# Patient Record
Sex: MALE | Race: WHITE
[De-identification: names, ages, dates, MRNs, and addresses within clinical notes are randomized per-mention and may not be internally consistent; named-entity substitution may affect disease eponyms.]

---

## 2018-06-20 ENCOUNTER — HOSPITAL ENCOUNTER (OUTPATIENT)
Dept: HOSPITAL 88 - ER | Age: 33
Setting detail: OBSERVATION
Discharge: HOME | End: 2018-06-20
Attending: INTERNAL MEDICINE | Admitting: INTERNAL MEDICINE
Payer: COMMERCIAL

## 2018-06-20 VITALS — DIASTOLIC BLOOD PRESSURE: 90 MMHG | SYSTOLIC BLOOD PRESSURE: 156 MMHG

## 2018-06-20 VITALS — SYSTOLIC BLOOD PRESSURE: 139 MMHG | DIASTOLIC BLOOD PRESSURE: 81 MMHG

## 2018-06-20 VITALS — SYSTOLIC BLOOD PRESSURE: 124 MMHG | DIASTOLIC BLOOD PRESSURE: 68 MMHG

## 2018-06-20 VITALS — SYSTOLIC BLOOD PRESSURE: 130 MMHG | DIASTOLIC BLOOD PRESSURE: 76 MMHG

## 2018-06-20 VITALS — HEIGHT: 73 IN | WEIGHT: 247 LBS | BODY MASS INDEX: 32.74 KG/M2

## 2018-06-20 VITALS — SYSTOLIC BLOOD PRESSURE: 162 MMHG | DIASTOLIC BLOOD PRESSURE: 82 MMHG

## 2018-06-20 VITALS — DIASTOLIC BLOOD PRESSURE: 81 MMHG | SYSTOLIC BLOOD PRESSURE: 139 MMHG

## 2018-06-20 DIAGNOSIS — I10: ICD-10-CM

## 2018-06-20 DIAGNOSIS — R10.13: ICD-10-CM

## 2018-06-20 DIAGNOSIS — F17.210: ICD-10-CM

## 2018-06-20 DIAGNOSIS — N30.00: ICD-10-CM

## 2018-06-20 DIAGNOSIS — R00.2: ICD-10-CM

## 2018-06-20 DIAGNOSIS — F41.9: ICD-10-CM

## 2018-06-20 DIAGNOSIS — R07.9: Primary | ICD-10-CM

## 2018-06-20 DIAGNOSIS — F90.9: ICD-10-CM

## 2018-06-20 DIAGNOSIS — R68.84: ICD-10-CM

## 2018-06-20 LAB
ALBUMIN SERPL-MCNC: 4.1 G/DL (ref 3.5–5)
ALBUMIN/GLOB SERPL: 1.2 {RATIO} (ref 0.8–2)
ALP SERPL-CCNC: 72 IU/L (ref 40–150)
ALT SERPL-CCNC: 41 IU/L (ref 0–55)
AMPHETAMINES UR QL SCN>1000 NG/ML: POSITIVE
ANION GAP SERPL CALC-SCNC: 14.6 MMOL/L (ref 8–16)
BACTERIA URNS QL MICRO: (no result) /HPF
BASOPHILS # BLD AUTO: 0.1 10*3/UL (ref 0–0.1)
BASOPHILS NFR BLD AUTO: 0.7 % (ref 0–1)
BENZODIAZ UR QL SCN: NEGATIVE
BILIRUB UR QL: NEGATIVE
BUN SERPL-MCNC: 10 MG/DL (ref 7–26)
BUN/CREAT SERPL: 10 (ref 6–25)
CALCIUM SERPL-MCNC: 9.9 MG/DL (ref 8.4–10.2)
CHLORIDE SERPL-SCNC: 101 MMOL/L (ref 98–107)
CHOLEST SERPL-MCNC: 194 MD/DL (ref 0–199)
CHOLEST/HDLC SERPL: 4.1 {RATIO} (ref 3.9–4.7)
CK MB SERPL-MCNC: 3.7 NG/ML (ref 0–5)
CK MB SERPL-MCNC: 4.2 NG/ML (ref 0–5)
CK SERPL-CCNC: 258 IU/L (ref 30–200)
CK SERPL-CCNC: 326 IU/L (ref 30–200)
CLARITY UR: (no result)
CO2 SERPL-SCNC: 26 MMOL/L (ref 22–29)
COLOR UR: YELLOW
DEPRECATED APTT PLAS QN: 33.4 SECONDS (ref 23.8–35.5)
DEPRECATED INR PLAS: 1.04
DEPRECATED NEUTROPHILS # BLD AUTO: 5.7 10*3/UL (ref 2.1–6.9)
DEPRECATED RBC URNS MANUAL-ACNC: (no result) /HPF (ref 0–5)
EGFRCR SERPLBLD CKD-EPI 2021: > 60 ML/MIN (ref 60–?)
EOSINOPHIL # BLD AUTO: 0.5 10*3/UL (ref 0–0.4)
EOSINOPHIL NFR BLD AUTO: 5.1 % (ref 0–6)
EPI CELLS URNS QL MICRO: (no result) /LPF
ERYTHROCYTE [DISTWIDTH] IN CORD BLOOD: 12.4 % (ref 11.7–14.4)
GLOBULIN PLAS-MCNC: 3.5 G/DL (ref 2.3–3.5)
GLUCOSE SERPLBLD-MCNC: 103 MG/DL (ref 74–118)
HCT VFR BLD AUTO: 46.6 % (ref 38.2–49.6)
HDLC SERPL-MSCNC: 47 MG/DL (ref 40–60)
HGB BLD-MCNC: 16.4 G/DL (ref 14–18)
KETONES UR QL STRIP.AUTO: NEGATIVE
LDLC SERPL CALC-MCNC: 111 MG/DL (ref 60–130)
LEUKOCYTE ESTERASE UR QL STRIP.AUTO: (no result)
LYMPHOCYTES # BLD: 2.6 10*3/UL (ref 1–3.2)
LYMPHOCYTES NFR BLD AUTO: 26.4 % (ref 18–39.1)
MAGNESIUM SERPL-MCNC: 1.8 MG/DL (ref 1.3–2.1)
MCH RBC QN AUTO: 32.2 PG (ref 28–32)
MCHC RBC AUTO-ENTMCNC: 35.2 G/DL (ref 31–35)
MCV RBC AUTO: 91.4 FL (ref 81–99)
MONOCYTES # BLD AUTO: 0.8 10*3/UL (ref 0.2–0.8)
MONOCYTES NFR BLD AUTO: 8.5 % (ref 4.4–11.3)
NEUTS SEG NFR BLD AUTO: 59 % (ref 38.7–80)
NITRITE UR QL STRIP.AUTO: NEGATIVE
PCP UR QL SCN: NEGATIVE
PLATELET # BLD AUTO: 346 X10E3/UL (ref 140–360)
POTASSIUM SERPL-SCNC: 3.6 MMOL/L (ref 3.5–5.1)
PROT UR QL STRIP.AUTO: NEGATIVE
PROTHROMBIN TIME: 12.8 SECONDS (ref 11.9–14.5)
RBC # BLD AUTO: 5.1 X10E6/UL (ref 4.3–5.7)
SODIUM SERPL-SCNC: 138 MMOL/L (ref 136–145)
SP GR UR STRIP: 1.02 (ref 1.01–1.02)
TRIGL SERPL-MCNC: 181 MG/DL (ref 0–149)
TSH SERPL DL<=0.005 MIU/L-ACNC: 0.96 UIU/ML (ref 0.35–4.94)
UROBILINOGEN UR STRIP-MCNC: 0.2 MG/DL (ref 0.2–1)
WBC #/AREA URNS HPF: >50 /HPF (ref 0–5)

## 2018-06-20 PROCEDURE — 74177 CT ABD & PELVIS W/CONTRAST: CPT

## 2018-06-20 PROCEDURE — 93005 ELECTROCARDIOGRAM TRACING: CPT

## 2018-06-20 PROCEDURE — 81001 URINALYSIS AUTO W/SCOPE: CPT

## 2018-06-20 PROCEDURE — 85730 THROMBOPLASTIN TIME PARTIAL: CPT

## 2018-06-20 PROCEDURE — 99284 EMERGENCY DEPT VISIT MOD MDM: CPT

## 2018-06-20 PROCEDURE — 71260 CT THORAX DX C+: CPT

## 2018-06-20 PROCEDURE — 82553 CREATINE MB FRACTION: CPT

## 2018-06-20 PROCEDURE — 83735 ASSAY OF MAGNESIUM: CPT

## 2018-06-20 PROCEDURE — 80061 LIPID PANEL: CPT

## 2018-06-20 PROCEDURE — 83880 ASSAY OF NATRIURETIC PEPTIDE: CPT

## 2018-06-20 PROCEDURE — 82550 ASSAY OF CK (CPK): CPT

## 2018-06-20 PROCEDURE — 80053 COMPREHEN METABOLIC PANEL: CPT

## 2018-06-20 PROCEDURE — 71046 X-RAY EXAM CHEST 2 VIEWS: CPT

## 2018-06-20 PROCEDURE — 93306 TTE W/DOPPLER COMPLETE: CPT

## 2018-06-20 PROCEDURE — 84443 ASSAY THYROID STIM HORMONE: CPT

## 2018-06-20 PROCEDURE — 85610 PROTHROMBIN TIME: CPT

## 2018-06-20 PROCEDURE — 93017 CV STRESS TEST TRACING ONLY: CPT

## 2018-06-20 PROCEDURE — 84484 ASSAY OF TROPONIN QUANT: CPT

## 2018-06-20 PROCEDURE — 85379 FIBRIN DEGRADATION QUANT: CPT

## 2018-06-20 PROCEDURE — 36415 COLL VENOUS BLD VENIPUNCTURE: CPT

## 2018-06-20 PROCEDURE — 85025 COMPLETE CBC W/AUTO DIFF WBC: CPT

## 2018-06-20 PROCEDURE — 80307 DRUG TEST PRSMV CHEM ANLYZR: CPT

## 2018-06-20 NOTE — DIAGNOSTIC IMAGING REPORT
PROCEDURE: CT ABDOMEN AND PELVIS WITH CONTRAST

 

TECHNIQUE: 

The abdomen and pelvis were scanned utilizing a multidetector helical 

scanner from the diaphragm to the lesser trochanter after the IV 

administration of 100 cc of Isovue 370 and the oral administration of 

900 cc of water.  Coronal and sagittal multiplanar reformations were 

obtained.

DLP: 1078.55 mGy-cm

 

COMPARISON: None.

 

INDICATIONS:   CHEST, ABDOMEN PAIN

 

FINDINGS:

THORAX: Please see same day chest CT for chest findings. 

 

HEPATOBILIARY: No focal hepatic lesions.  No biliary ductal dilatation.

SPLEEN: No splenomegaly.

PANCREAS: No focal masses or ductal dilatation.

 

ADRENALS: No adrenal nodules.

KIDNEYS/URETERS: No hydronephrosis, stones, or solid mass lesions.

PELVIC ORGANS/BLADDER: Unremarkable.

 

PERITONEUM / RETROPERITONEUM: No free air or fluid.

LYMPH NODES: No lymphadenopathy.

VESSELS: Unremarkable.

 

GI TRACT: No distention or wall thickening.

 

BONES AND SOFT TISSUES: Unremarkable.

 

IMPRESSION:

No acute abnormalities in the abdomen and pelvis. 

 

 

Dictated by:  Anand Mcdermott M.D. on 6/20/2018 at 13:48     

Electronically approved by:  Anand Mcdermott M.D. on 6/20/2018 at 13:48

## 2018-06-20 NOTE — DIAGNOSTIC IMAGING REPORT
PROCEDURE:

CT scan of the chest  WITH intravenous contrast, using standard 

protocol.

 

TECHNIQUE:

The chest was scanned utilizing a multidetector helical scanner from 

the lung apex through the level of the adrenal glands after the IV 

administration of 100 cc of Isovue 370.  Coronal and sagittal 

multiplanar reformations were obtained.

DLP: 1078.55 mGy-cm

 

COMPARISON: Same day chest radiograph. 

 

INDICATIONS:  CHEST, ABDOMEN PAIN

    

FINDINGS:

Lines/tubes:  None.

 

Lungs and Airways:  The lungs and airways are normal. There is mild 

dependent atelectasis. No consolidations are demonstrated. 

 

Pleura: The pleural spaces are clear.

 

Heart and mediastinum: The thyroid gland is normal. No significant 

mediastinal, hilar or axillary lymphadenopathy is seen. The heart and 

pericardium are within normal limits. Main pulmonary artery measures 

2.5 cm in diameter, within normal limits. Ascending aorta measures 2.5 

cm in diameter, within normal limits.

 

Soft tissues: Normal. 

 

Abdomen: Please see same day CT abdomen and pelvis for abdominal 

findings. 

 

Bones: The visualized bony thorax is within normal limits. 

 

IMPRESSION: 

Normal chest CT.

 

 

 

Dictated by:  Anand Mcdermott M.D. on 6/20/2018 at 13:41     

Electronically approved by:  Anand Mcdermott M.D. on 6/20/2018 at 13:41

## 2018-06-20 NOTE — HISTORY AND PHYSICAL
PCP:  Prem Rocha MD 



CONSULTANT:  Nuvia Gold MD 



CHIEF COMPLAINT:  Epigastric pain and chest pain associated with radiation 

of pain to the left upper extremity and to the jaw area. 



HISTORY OF PRESENT ILLNESS:  The patient is a pleasant, 33-year-old male 

with ADD.  The patient is on Adderall.  Basically he was doing fine, but 

approximately 2 months ago he had the same similar episode where he had 

some upper abdominal pain and subsequent left-sided chest pain, like 

squeezing pain radiating to his left jaw and to the left upper extremity.  

While he was trying to sleep, he developed a jerking movement.  The pain is 

intensified.  Blood pressure went up.  The patient in the emergency room, 

his blood pressure went up to the 163 to 111.  Previously, when he has 

taken blood pressure medication, he felt better.  Patient's blood pressure 

now is 124/68.  His pulse rate is 101.  Otherwise, the patient is stable.  

He is asymptomatic now.  He did have one episode earlier where he had 

severe chest pain where he received ketorolac, and it improved his 

symptoms.



PAST MEDICAL HISTORY:  ADD and elevation of blood pressure at times.



HOME MEDICATIONS:  Adderall 20 mg t.i.d.



ALLERGIES:  NO KNOWN ALLERGIES.



PAST SURGICAL HISTORY:  Noncontributory. 



SOCIAL HISTORY:  Patient works as a  in a chemical plant.  He 

has protective gear and no recent chemical exposure.  He does smoke.  He is 

a social drinker. 



PHYSICAL EXAMINATION

VITAL SIGNS:  Temperature is 98.  Blood pressure 124/68.  Pulse rate 86.  

Respirations 20.  GENERAL EXAM:  The patient is not in acute distress.  He 

is awake.  

HEENT:  Normocephalic.  Sclerae are anicteric. 

NECK:  Supple grossly. 

PULMONARY:  Clear. 

CARDIOVASCULAR:  Regular rate and rhythm.  

ABDOMEN:  Soft.  Generalized discomfort, but no guarding or rebound 

tenderness.  

EXTREMITIES:  No gross cyanosis or edema. 

NEUROLOGIC:  There is no gross focal deficit. 



LABORATORY:  Sodium is 138, potassium 3.6, chloride 101, bicarb 26, BUN 10, 

creatinine 0.9.  Glucose is 103.  Creatinine kinase is 326.  AST is 35.  

ALT is 41.  Cardiac enzymes are negative.



Toxicology positive for amphetamine for which the patient has prescribed 

medication.  Positive for opiate.



Urine has 1+ leukocyte esterase, greater than 50 WBCs.  WBC is 9.6, 

hemoglobin 16.4, hematocrit 46.6 and platelets is 346.



Chest x-ray is unremarkable.



IMPRESSION

1. Abdominal and chest pain, etiology unclear, recurrent.  This is the 

2nd time.

2. Urinary tract infection.

3. Pressure chest pain.

4. Episodic increase in blood pressure.



PLAN

1. CT of the chest, abdomen and pelvis with contrast.  

2. Echocardiogram.  

3. Consultation with Dr. Nuvia Gold.  

4. Continue with supportive medication.  

5. Pain control.  

6. Will monitor the patient closely.  

7. The patient will resume his home medications.





  _________________________________

LUIS FERNANDO LORENZO MD



DD:  06/20/2018 09:37

DT:  06/20/2018 10:05

Job#:  [id number]

## 2018-06-20 NOTE — CARDIOLOGY REPORT
DATE OF STUDY:  June 20, 2018 



CARDIAC STRESS TEST  



TECHNICAL DETAILS:  The protocol is Terrence with target heart rate at 161 per 

minute (85%).



RESULTS

1. Patient exercised for a total of 9 minutes 1 second.

2. Heart rate increased from 70 per minute to 165 per minute.

3. Blood pressure increased from 110/70 to 160/80.

4. No chest pain.

5. No EKG changes.



IMPRESSION:  Negative cardiac stress test.  Limitations of a negative 

cardiac stress test are explained and discussed.  









DD:  06/20/2018 12:42

DT:  06/20/2018 13:12

Job#:  [id number]

## 2018-06-20 NOTE — DIAGNOSTIC IMAGING REPORT
CHEST 2 VIEWS,    



Technique: CHEST 2 VIEWS

Comparison: 7/11/2015

Clinical history:      Chest pain 



DISCUSSION:

Normal appearance of the heart, mediastinum, lungs and pleural spaces.



IMPRESSION:

No acute abnormality



Signed by: Dr Maryann Rodas MD on 6/20/2018 3:30 AM

## 2018-06-20 NOTE — CONSULTATION
DATE OF CONSULTATION:  June 20, 2018 



CARDIOLOGY CONSULTATION 



REASON FOR CONSULTATION:  Chest pain.



HPI:  This is a 33-year-old male with history of ADHD on Adderall therapy 

and history of "occasional hypertension."  The patient presented to 

[facility] ER with complaints of initial abdominal pain and 

some left-sided chest tightness.  Therefore, cardiology was consulted.  The 

patient was seen in the room in no acute distress.  He reports he has had 

several episodes of left-sided chest pain and tightness going down to his 

left shoulder and arm.  However, last night, while he was lying in bed, he 

had symptoms again.  Therefore, he came to the ER for evaluation.  He 

reports during these chest pain episodes some shortness of breath, 

palpitations and some slight nausea.  He states the discomforts lasted for 

hours, and they relieved on their own.  Labs drawn are showing negative 

troponin times 2 and negative BNP.  EKG showing no ST changes suggestive of 

acute event.  Of note, he had a UA which was suggestive of urinary tract 

infection, and he has been given antibiotic therapy.



PAST MEDICAL HISTORY:  ADHD, also "occasional hypertension."



HOME MEDICATIONS:  Adderall 20 mg t.i.d. 



SURGICAL HISTORY:  Left meniscus repair.  



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 

services.  He is a smoker for greater than 10 years of 1/2 pack per day.  

He reports alcohol use on occasion.



FAMILY HISTORY:  Mother is alive, age 50, with a history of hypertension.  

Father at age 60 apparently has a history of alcoholism.  Maternal 

grandmother has history of bypass.  Paternal grandfather has a history of 

CAD and CABG.  



ALLERGIES:  NO KNOWN ALLERGIES.  



REVIEW OF SYSTEMS

GENERAL:  Denies any fever, chills, weight gain, night sweats.

SKIN:  No rashes or sores.

HEENT:  Some nausea and vomiting times 1.  Denies any visual changes.  No 

blurred vision or double vision, any earaches or discharge, any epistaxis, 

rhinorrhea, stuffiness, any sore throat or hoarseness.

CARDIAC:  Positive chest pain as above.  Occasional palpitations.  Positive 

dyspnea on exertion.  Denies any orthopnea, PND, or lower extremity edema.

RESPIRATORY:  Positive for shortness of breath on exertion.  Denies any 

wheezing or hemoptysis.

GI:  Good appetite.  Occasional nausea and vomiting.  Denies any 

hematemesis.

URINARY:  Denies any polyuria, dysuria, hematuria.

VASCULAR:  Denies any lower extremity edema or claudication.

MUSCULOSKELETAL:  Generalized joint pains and bilateral knee pains.

NEURO:  Right toe numbness.  No blackouts, fainting, seizures.

HEMATOLOGY:  No anemia.  No easy bruising. 

ENDOCRINE:  No heat or cold intolerance.  No polyuria, polydipsia, or 

polyphagia.



PHYSICAL EXAMINATION 

VITAL SIGNS:  Current temperature 97, pulse 81, respiratory rate 17, blood 

pressure 130/76, pulse ox 98%.  

GENERAL:  In no acute distress, reliable informant.  Well developed.  

SKIN:  No rashes or bruises.  Positive tattoos through left shoulder and 

left arm.

HEENT:  Normocephalic.  Pupils are equal and reactive.  Extraocular motor 

intact.  Trachea is midline.  No thyromegaly.  No JVD.  No carotid bruits 

noted.

HEART:  Regular rate and rhythm.  No murmurs.  No clicks.

LUNGS:  Bilateral breath sounds are clear to auscultation.  

ABDOMEN:  Soft, nontender and nondistended.  No organomegaly noted.

MUSCULOSKELETAL:  Good muscle strength throughout.  No lower extremity 

swelling.

VASCULAR:  There are +2 bilateral radial pulses and +2 DP and PT pulses 

bilaterally.

NEUROLOGIC:  Cranial nerves II through XII seem intact.



LABS:  Sodium 138, potassium 3.6, BUN 10, creatinine 0.9.  Troponin less 

than 0.001 times 2.  BNP less than 10.  HDL 47, .  Hemoglobin 16, 

hematocrit 46, platelets 346.  D-dimer less than 100.  UA is showing 

leukocyte esterase +1 and urine WBCs greater than 50. 



Chest x-ray:  No acute abnormalities.



EKG:  Sinus tachycardia at 100.  No ST changes suggestive of an acute 

event.  



ASSESSMENT

1. Urinary tract infection.

2. Chest pain.

3. Hypertension.

4. Palpitations.

5. Anxiety.



PLAN

1. The patient presents with mixed features.  Thus far, negative enzymes 

times 2.  EKG without any acute changes suggestive of an acute event.  

The patient is very concerned with these symptoms.  He reports this is 

the 3rd time he has had these chest pain symptoms and wants further 

evaluation.  Echocardiogram has been ordered and is currently being 

done.  Will be reviewed by cardiology attending.  

2. Will go ahead and do a stress test as per the patient's request to 

evaluate the symptoms.   

3. Will continue tele monitoring.  The patient is reporting palpitations 

intermittently.  However, tele was reviewed, and no arrhythmia was 

noted.  

4. Antibiotic therapy as per primary service for his UTI.

5. The blood pressure is reasonable right now.  We will just continue to 

monitor for now.

6. We will go ahead and check a TSH level.



Thank you very much for this consult.  We will adjust cardiac therapy as 

course dictates.



Dictated by:  Rolando Renee NP



 





DD:  06/20/2018 12:08

DT:  06/20/2018 13:01

Job#:  [id number]

## 2019-08-27 ENCOUNTER — HOSPITAL ENCOUNTER (INPATIENT)
Dept: HOSPITAL 88 - ER | Age: 34
LOS: 1 days | Discharge: HOME | DRG: 897 | End: 2019-08-28
Attending: INTERNAL MEDICINE | Admitting: INTERNAL MEDICINE
Payer: SELF-PAY

## 2019-08-27 VITALS — DIASTOLIC BLOOD PRESSURE: 59 MMHG | SYSTOLIC BLOOD PRESSURE: 85 MMHG

## 2019-08-27 VITALS — SYSTOLIC BLOOD PRESSURE: 113 MMHG | DIASTOLIC BLOOD PRESSURE: 65 MMHG

## 2019-08-27 VITALS — DIASTOLIC BLOOD PRESSURE: 71 MMHG | SYSTOLIC BLOOD PRESSURE: 124 MMHG

## 2019-08-27 VITALS — DIASTOLIC BLOOD PRESSURE: 86 MMHG | SYSTOLIC BLOOD PRESSURE: 127 MMHG

## 2019-08-27 VITALS — DIASTOLIC BLOOD PRESSURE: 81 MMHG | SYSTOLIC BLOOD PRESSURE: 140 MMHG

## 2019-08-27 VITALS — SYSTOLIC BLOOD PRESSURE: 104 MMHG | DIASTOLIC BLOOD PRESSURE: 79 MMHG

## 2019-08-27 VITALS — SYSTOLIC BLOOD PRESSURE: 126 MMHG | DIASTOLIC BLOOD PRESSURE: 79 MMHG

## 2019-08-27 VITALS — SYSTOLIC BLOOD PRESSURE: 128 MMHG | DIASTOLIC BLOOD PRESSURE: 75 MMHG

## 2019-08-27 VITALS — SYSTOLIC BLOOD PRESSURE: 102 MMHG | DIASTOLIC BLOOD PRESSURE: 63 MMHG

## 2019-08-27 VITALS — DIASTOLIC BLOOD PRESSURE: 83 MMHG | SYSTOLIC BLOOD PRESSURE: 128 MMHG

## 2019-08-27 VITALS — DIASTOLIC BLOOD PRESSURE: 76 MMHG | SYSTOLIC BLOOD PRESSURE: 129 MMHG

## 2019-08-27 VITALS — DIASTOLIC BLOOD PRESSURE: 94 MMHG | SYSTOLIC BLOOD PRESSURE: 143 MMHG

## 2019-08-27 VITALS — DIASTOLIC BLOOD PRESSURE: 108 MMHG | SYSTOLIC BLOOD PRESSURE: 198 MMHG

## 2019-08-27 VITALS — SYSTOLIC BLOOD PRESSURE: 88 MMHG | DIASTOLIC BLOOD PRESSURE: 70 MMHG

## 2019-08-27 VITALS — WEIGHT: 210 LBS | HEIGHT: 73 IN | BODY MASS INDEX: 27.83 KG/M2

## 2019-08-27 VITALS — DIASTOLIC BLOOD PRESSURE: 87 MMHG | SYSTOLIC BLOOD PRESSURE: 128 MMHG

## 2019-08-27 VITALS — SYSTOLIC BLOOD PRESSURE: 88 MMHG | DIASTOLIC BLOOD PRESSURE: 69 MMHG

## 2019-08-27 VITALS — SYSTOLIC BLOOD PRESSURE: 137 MMHG | DIASTOLIC BLOOD PRESSURE: 66 MMHG

## 2019-08-27 DIAGNOSIS — G45.9: ICD-10-CM

## 2019-08-27 DIAGNOSIS — F15.10: ICD-10-CM

## 2019-08-27 DIAGNOSIS — G89.29: ICD-10-CM

## 2019-08-27 DIAGNOSIS — M62.82: ICD-10-CM

## 2019-08-27 DIAGNOSIS — R16.1: ICD-10-CM

## 2019-08-27 DIAGNOSIS — Z96.653: ICD-10-CM

## 2019-08-27 DIAGNOSIS — F90.9: ICD-10-CM

## 2019-08-27 DIAGNOSIS — F11.23: Primary | ICD-10-CM

## 2019-08-27 LAB
ALBUMIN SERPL-MCNC: 4.2 G/DL (ref 3.5–5)
ALBUMIN/GLOB SERPL: 1.3 {RATIO} (ref 0.8–2)
ALP SERPL-CCNC: 76 IU/L (ref 40–150)
ALT SERPL-CCNC: 36 IU/L (ref 0–55)
AMPHETAMINES UR QL SCN>1000 NG/ML: POSITIVE
ANION GAP SERPL CALC-SCNC: 13.9 MMOL/L (ref 8–16)
BACTERIA URNS QL MICRO: (no result) /HPF
BASE EXCESS BLDA CALC-SCNC: 0 MMOL/L (ref -2–3)
BASOPHILS # BLD AUTO: 0.1 10*3/UL (ref 0–0.1)
BASOPHILS NFR BLD AUTO: 0.7 % (ref 0–1)
BENZODIAZ UR QL SCN: POSITIVE
BILIRUB UR QL: NEGATIVE
BUN SERPL-MCNC: 10 MG/DL (ref 7–26)
BUN/CREAT SERPL: 10 (ref 6–25)
CALCIUM SERPL-MCNC: 10.2 MG/DL (ref 8.4–10.2)
CHLORIDE SERPL-SCNC: 103 MMOL/L (ref 98–107)
CK MB SERPL-MCNC: 11.5 NG/ML (ref 0–5)
CK MB SERPL-MCNC: 7.4 NG/ML (ref 0–5)
CK MB SERPL-MCNC: 8.5 NG/ML (ref 0–5)
CK SERPL-CCNC: 359 IU/L (ref 30–200)
CK SERPL-CCNC: 436 IU/L (ref 30–200)
CK SERPL-CCNC: 512 IU/L (ref 30–200)
CLARITY UR: CLEAR
CO2 SERPL-SCNC: 25 MMOL/L (ref 22–29)
COLOR UR: YELLOW
DEPRECATED NEUTROPHILS # BLD AUTO: 8 10*3/UL (ref 2.1–6.9)
DEPRECATED RBC URNS MANUAL-ACNC: (no result) /HPF (ref 0–5)
EGFRCR SERPLBLD CKD-EPI 2021: > 60 ML/MIN (ref 60–?)
EOSINOPHIL # BLD AUTO: 0.3 10*3/UL (ref 0–0.4)
EOSINOPHIL NFR BLD AUTO: 2.4 % (ref 0–6)
EPI CELLS URNS QL MICRO: (no result) /LPF
ERYTHROCYTE [DISTWIDTH] IN CORD BLOOD: 12.5 % (ref 11.7–14.4)
GLOBULIN PLAS-MCNC: 3.2 G/DL (ref 2.3–3.5)
GLUCOSE SERPLBLD-MCNC: 111 MG/DL (ref 74–118)
HCO3 BLDA-SCNC: 26 MMOL/L (ref 23–28)
HCT VFR BLD AUTO: 46.6 % (ref 38.2–49.6)
HGB BLD-MCNC: 16.1 G/DL (ref 14–18)
KETONES UR QL STRIP.AUTO: NEGATIVE
LEUKOCYTE ESTERASE UR QL STRIP.AUTO: (no result)
LYMPHOCYTES # BLD: 1.6 10*3/UL (ref 1–3.2)
LYMPHOCYTES NFR BLD AUTO: 15 % (ref 18–39.1)
MCH RBC QN AUTO: 31.5 PG (ref 28–32)
MCHC RBC AUTO-ENTMCNC: 34.5 G/DL (ref 31–35)
MCV RBC AUTO: 91.2 FL (ref 81–99)
MONOCYTES # BLD AUTO: 0.8 10*3/UL (ref 0.2–0.8)
MONOCYTES NFR BLD AUTO: 7 % (ref 4.4–11.3)
NEUTS SEG NFR BLD AUTO: 74.4 % (ref 38.7–80)
NITRITE UR QL STRIP.AUTO: NEGATIVE
PCO2 BLDA: 172 MMHG (ref 80–105)
PCO2 BLDA: 51 MMHG (ref 41–51)
PCP UR QL SCN: NEGATIVE
PH BLDA: 7.32 [PH] (ref 7.31–7.41)
PLATELET # BLD AUTO: 374 X10E3/UL (ref 140–360)
POTASSIUM SERPL-SCNC: 3.9 MMOL/L (ref 3.5–5.1)
PROT UR QL STRIP.AUTO: NEGATIVE
RBC # BLD AUTO: 5.11 X10E6/UL (ref 4.3–5.7)
SAO2 % BLDA: 99 % (ref 95–98)
SODIUM SERPL-SCNC: 138 MMOL/L (ref 136–145)
SP GR UR STRIP: <=1.005 (ref 1.01–1.02)
UROBILINOGEN UR STRIP-MCNC: 0.2 MG/DL (ref 0.2–1)
WBC #/AREA URNS HPF: (no result) /HPF (ref 0–5)

## 2019-08-27 PROCEDURE — 82553 CREATINE MB FRACTION: CPT

## 2019-08-27 PROCEDURE — 81001 URINALYSIS AUTO W/SCOPE: CPT

## 2019-08-27 PROCEDURE — 80076 HEPATIC FUNCTION PANEL: CPT

## 2019-08-27 PROCEDURE — 80053 COMPREHEN METABOLIC PANEL: CPT

## 2019-08-27 PROCEDURE — 36600 WITHDRAWAL OF ARTERIAL BLOOD: CPT

## 2019-08-27 PROCEDURE — 80307 DRUG TEST PRSMV CHEM ANLYZR: CPT

## 2019-08-27 PROCEDURE — 84484 ASSAY OF TROPONIN QUANT: CPT

## 2019-08-27 PROCEDURE — 70450 CT HEAD/BRAIN W/O DYE: CPT

## 2019-08-27 PROCEDURE — 87340 HEPATITIS B SURFACE AG IA: CPT

## 2019-08-27 PROCEDURE — 85025 COMPLETE CBC W/AUTO DIFF WBC: CPT

## 2019-08-27 PROCEDURE — 99285 EMERGENCY DEPT VISIT HI MDM: CPT

## 2019-08-27 PROCEDURE — 94002 VENT MGMT INPAT INIT DAY: CPT

## 2019-08-27 PROCEDURE — 83605 ASSAY OF LACTIC ACID: CPT

## 2019-08-27 PROCEDURE — 36415 COLL VENOUS BLD VENIPUNCTURE: CPT

## 2019-08-27 PROCEDURE — 31500 INSERT EMERGENCY AIRWAY: CPT

## 2019-08-27 PROCEDURE — 82805 BLOOD GASES W/O2 SATURATION: CPT

## 2019-08-27 PROCEDURE — 83735 ASSAY OF MAGNESIUM: CPT

## 2019-08-27 PROCEDURE — 82550 ASSAY OF CK (CPK): CPT

## 2019-08-27 PROCEDURE — 76700 US EXAM ABDOM COMPLETE: CPT

## 2019-08-27 PROCEDURE — 71045 X-RAY EXAM CHEST 1 VIEW: CPT

## 2019-08-27 PROCEDURE — 86803 HEPATITIS C AB TEST: CPT

## 2019-08-27 PROCEDURE — 93005 ELECTROCARDIOGRAM TRACING: CPT

## 2019-08-27 RX ADMIN — PROPOFOL PRN MLS/HR: 10 INJECTION, EMULSION INTRAVENOUS at 07:50

## 2019-08-27 RX ADMIN — SODIUM CHLORIDE SCH MLS/HR: 9 INJECTION, SOLUTION INTRAVENOUS at 16:29

## 2019-08-27 RX ADMIN — PROPOFOL PRN MLS/HR: 10 INJECTION, EMULSION INTRAVENOUS at 14:30

## 2019-08-27 RX ADMIN — SODIUM CHLORIDE PRN MLS/HR: 900 INJECTION, SOLUTION INTRAVENOUS at 08:55

## 2019-08-27 RX ADMIN — SODIUM CHLORIDE SCH MLS/HR: 9 INJECTION, SOLUTION INTRAVENOUS at 09:27

## 2019-08-27 RX ADMIN — PROPOFOL PRN MLS/HR: 10 INJECTION, EMULSION INTRAVENOUS at 11:21

## 2019-08-27 RX ADMIN — SODIUM CHLORIDE PRN MLS/HR: 900 INJECTION, SOLUTION INTRAVENOUS at 09:59

## 2019-08-27 RX ADMIN — PROPOFOL PRN MLS/HR: 10 INJECTION, EMULSION INTRAVENOUS at 09:26

## 2019-08-27 RX ADMIN — SODIUM CHLORIDE SCH MLS/HR: 9 INJECTION, SOLUTION INTRAVENOUS at 23:39

## 2019-08-27 NOTE — NUR
DR LEYVA AT BEDSIDE DISCUSSING THE CURRENT PLAN OF CARE WITH PATIENT AND 
FAMILY, PATIENT TO BE INTUBATED AND SEDATED FOR PATIENT SAFETY. PATIENT 
AGGITATED, WILL NOT SIT IN BED, CLIMBING OVER BED RAILS, AGGRESSIVE, 
UNCOOPERATIVE AFTER RECEIVING GEODON AND VERSED. MULTIPLE RN'S AT BEDSIDE WITH 
PATIENT. NOTIFIED RESPIRATORY. RESPIRATORY AT BEDSIDE FOR STAT INTUBATION.

## 2019-08-27 NOTE — NUR
PATIENT CONFUSED, DISORIENTED, AGGRESSIVE, COMBATIVE, UNCOOPERATIVE, CLIMBING 
OVER BED RAILS. PATIENT RE-ORITENTED, HELPED BACK INTO BED BY DOUG PANG, 
EDUCATED PATIENT AND FAMILY ON SAFETY MEASURES. RN CONTINUALLY AT BEDSIDE FOR 
PATIENT SAFETY.  DR LEYVA AT BEDSIDE FOR RE-EVAL AND DISCUSSING CURRENT 
PLAN OF CARE WITH PATIENT AND FAMILY, FAMILY VERBALIZED UNDERSTANDING.

## 2019-08-27 NOTE — NUR
Bedside report received from Giovana CONROY RN. Care plan reviewed, pts family/wife is at the 
bedside.

## 2019-08-27 NOTE — NUR
PT REPORT TAKEN FROM KIRSTY RN; PER KIRSTY PT AGITATED AND UNCOOPERATIVE; PT 
WOULD NOT SIT STILL FOR EKG OR BP; UPON INTRODUCTION PT IS PACING AROUND ROOM 
AND ATTEMPTED TO LEAVE SEVERAL TIMES; WARREN GUIDO SHANNEL, SHANITA LOFTON ALL HAD 
TO TALK PT INTO STAYING EACH TIME AND ENCOURAGE PT TO GET INTO BED SO AS TO 
AVOID FALL

## 2019-08-27 NOTE — NUR
PT PROGRESSIVELY BECOMING MORE AGITATED AND UNCOOPERATIVE; WIFE AT BEDSIDE 
TRYING CALM PT DOWN; PT TRIES TO JUMP OVER BED RAILINGS; WARREN AND MD BOTH TALK 
TO PT ABOUT STAYING IN BED AND AVOIDING INJURING SELF

## 2019-08-27 NOTE — NUR
VERBAL ORDER RECEIVED FOR RESTRAINTS. PATIENT MOVING ARMS, BITTING ET TUBE AND 
ATTEMPTING TO PULL AT ET TUBE. DR LEYVA AT BEDSIDE FOR RE-EVAL.

## 2019-08-27 NOTE — DIAGNOSTIC IMAGING REPORT
EXAMINATION: Head CT 



HISTORY: Altered mental status.

COMPARISON: None.

TECHNIQUE: Multidetector axial images were obtained without contrast from the

foramen magnum to the vertex . The images were reconstructed using brain and

bone algorithms.  Thin section brain images were reformatted into coronal and

sagittal planes.

Image quality: Motion/streaking artifact limits the evaluation of the skull

base and posterior cranial fossa.

Dose modulation, iterative reconstruction, and/or weight based adjustment of

the mA/kV was utilized to reduce the radiation dose to as low as reasonably

achievable.





FINDINGS:



     Parenchyma: 

1.  No abnormal densities.

2.  No mass or hemorrhage. No CT evidence of acute territorial vascular insult.



    

     Extra-axial spaces:No abnormal density. No extra-axial fluid collections 

     Brain volume: Normal for age. 

     Ventricles: No hydrocephalus or displacement.  

     Arteries: No density suggestive of thrombus. 

     Dural sinuses: No abnormal density. 

     Extra-axial spaces: No abnormal density. 

     Foramen magnum: No mass, Chiari malformation, or basilar invagination. 

     Sella: No obvious mass.  

     Paranasal/mastoid sinuses: Imaged portions unremarkable. 

     Skull/Scalp: No lytic or blastic lesions.  No fractures. 



IMPRESSION:



Normal head CT.



Signed by: Dr. Radha Buchanan M.D. on 8/27/2019 10:37 AM

## 2019-08-27 NOTE — DIAGNOSTIC IMAGING REPORT
Examination: Single AP view of the chest.



COMPARISON: 6/20/2018



INDICATION: Cough

     

DISCUSSION:



Lungs are well-inflated and without focal consolidation, pleural effusion, or

pneumothorax. Cardiomediastinal contour and pulmonary vasculature are within

normal limits. No acute osseous abnormality.



IMPRESSION:

 

1.   No acute cardiopulmonary abnormalities.



Signed by: Dr. Rolando Curtis M.D. on 8/27/2019 6:20 AM

## 2019-08-27 NOTE — XMS REPORT
Clinical Summary

                             Created on: 2019



Hector Dan

External Reference #: [id number]

: 1985

Sex: Male



Demographics







                          Address                   [address] [place] [address]  [id number]

 

                          Home Phone                [phone]

 

                          Preferred Language        English

 

                          Marital Status            Single

 

                          Quaker Affiliation     Unknown

 

                          Race                      White

 

                          Ethnic Group              Non-





Author







                          Author                    [facility]

 

                          Organization              [facility]

 

                          Address                   Unknown

 

                          Phone                     Unavailable







Support







                Name            Relationship    Address         Phone

 

                    Contact Need        [employer]                [address] [place] [address] [address]

[place]  [address]                      [phone]







Care Team Providers







                    Care Team Member Name    Role                Phone

 

                    Ashley Calvillo MD    PCP                 [phone]







Allergies

No Known Allergies



Medications







                          End Date                  Status



              Medication     Sig          Dispensed     Refills      Start  



                                         Date  

 

                                                    Active



                 AMPHETAMINE-DEXTROAMPHETA     20 mg 3         0                 



                     MINE 20 MG tablet     (three) times       6  



                                         a day. Rarely     



                                         takes TID     

 

                                                    Active



                     dexlansoprazole     Take 60 mg by       0   



                           (DEXILANT) 60 mg capsule     mouth daily.     

 

                                                    Active



              ondansetron (ZOFRAN) 4 MG     Take 1 tablet     20 tablet     0              



                     tablet              (4 mg total)        6  



                                         by mouth     



                                         every 8     



                                         (eight) hours     



                                         as needed for     



                                         nausea or     



                                         vomiting.     







Active Problems





No known active problems



Family History







   



                 Medical History     Relation        Name            Comments

 

   



                           Alcohol abuse             Father  

 

   



                           Drug abuse                Father  

 

   



                           Other                     Father  

 

   



                           No Known Problems         Mother  









   



                 Relation        Name            Status          Comments

 

   



                           Father                    Alive 

 

   



                           Mother                    Alive 







Social History







                                        Date



                 Tobacco Use     Types           Packs/Day       Years Used 

 

                                         



                 Current Every Day Smoker     Cigarettes      0.5             10 









                    Drinks/Week         oz/Week             Comments



                                         Alcohol Use   

 

                                        



3 Cans of beer                                      currently 3 drinks a month - but 6 months prior heavier etoh use





                                         Yes   









 



                           Sex Assigned at Birth     Date Recorded

 

 



                                         Not on file 









                                        Industry



                           Job Start Date            Occupation 

 

                                        Not on file



                           Not on file               Not on file 









                                        Travel End



                           Travel History            Travel Start 

 





                                         No recent travel history available.







Last Filed Vital Signs

Not on file



Plan of Treatment







   



                 Health Maintenance     Due Date        Last Done       Comments

 

   



                           INFLUENZA VACCINE         2019  







Results

Not on fileafter 2018



Insurance







                                        Type



            Payer      Benefit     Subscriber ID     Effective     Phone      Address 



                           Plan /                    Dates   



                                         Group     

 

                                        HMO/PPO



                 [facility]      xxxxxxxxx       2016-P   



                           THCARE                    resent   



                                         CHOICE/CHO     



                                         ICE +     









     



            Guarantor Name     Account     Relation to     Date of     Phone      Billing Address



                     Type                Patient             Birth  

 

     



            Hector Dan     Personal/F     Self       1985     [phone]     [address] [place], [place] [address]y               (Home)              [place] [id number]



                                         [phone] 



                                         (Work)

## 2019-08-27 NOTE — CONSULTATION
DATE OF CONSULTATION:  

 

Pulmonary Critical Care Consultation 

 

HISTORY OF PRESENT ILLNESS:  The patient is a 34-year-old man.  He has a history of

ADHD.  He was taking Adderall through Dr. Fink.  Apparently, he lost his medical

insurance and had difficulty affording the prescription.  He was subsequently buying it

from a friend.  He also has some chronic knee pain and was using Percocet through a [facility]. 

 

He ran out of Percocet 4 or 5 days ago and has been more irritable and agitated.

According to the family, he became agitated and delusional this morning.  He had to be

taken to the ER.  He received Geodon and Ativan.  The ER staff subsequently intubated

him and he is now in the ICU on a mechanical ventilator. 

 

PAST SURGICAL HISTORY:  Status post knee surgery.

 

PAST MEDICAL HISTORY:  

1. Chronic knee pain.

2. ADHD.

3. Hypertension.

 

SOCIAL HISTORY:  The patient is not a smoker.  The family does not know of any illicit

drug use.  He apparently was taking Adderall today that he was purchasing through a

friend. 

 

FAMILY HISTORY:  Family history is significant for hypertension.

 

ALLERGIES:  THERE ARE NO KNOWN DRUG ALLERGIES.

 

REVIEW OF SYSTEMS:

The patient is afebrile.  He has no headache.  He is not having any neck pain.  He is

not having any chest pain. 

 

He does not have any nausea or vomiting.  He has no leg edema.  He did have confusion

and agitation, but no focal neurological abnormalities. 

 

PHYSICAL EXAMINATION:

VITAL SIGNS:  The blood pressure is 158/64 and the pulse is 110-120.  He is on

assist-control mode of ventilation. 

HEENT:  Shows no facial swelling or erythema. 

CARDIAC:  Reveals regular rate and rhythm with normal S1 and S2.  There are no murmurs

or rubs heard. 

LUNGS:  Auscultation of lungs reveals clear breath sounds bilaterally.  There is no

wheezing. 

ABDOMEN:  Soft and nontender.  There is no rebound or guarding. 

EXTREMITIES:  Shows no leg edema or calf tenderness.  There is no cyanosis or clubbing. 

SKIN:  Shows no rashes. 

NEUROLOGIC:  Shows the patient to be sedated with no focal abnormalities.

LABORATORY DATA:  BUN to creatinine ratio is normal.  The other electrolytes within

normal limits.  CK is 512.  Blood counts are within normal limits. 

 

RADIOGRAPHIC DATA:  CT scan of the head shows no abnormalities. 

 

Chest x-ray shows no acute disease.

 

IMPRESSION:  

1. Acute toxic metabolic encephalopathy.

2. Acute delirium.

3. Attention-deficit/hyperactivity disorder.

4. Hypertension.

 

PLAN:  

1. Continue current sedatives.  Reassess the patient later today and place the patient

on a spontaneous breathing trial. 

2. Continue IV fluids.

3. Low-dose fentanyl to prevent any narcotic withdrawal.

 

 

 

 

______________________________

MD FABIANO Marinelli/MODL

D:  08/27/2019 11:53:37

T:  08/27/2019 16:06:20

Job #:  [id number]

## 2019-08-27 NOTE — XMS REPORT
Clinical Summary

                             Created on: 2019



Hector Dan

External Reference #: [id number]

: 1985

Sex: Male



Demographics







                          Address                   [address] [place] [address]  [address]

 

                          Home Phone                [phone]

 

                          Preferred Language        English

 

                          Marital Status            Single

 

                          Mandaen Affiliation     Unknown

 

                          Race                      White

 

                          Ethnic Group              Non-





Author







                          Author                    [facility]

 

                          Organization              [facility]

 

                          Address                   Unknown

 

                          Phone                     Unavailable







Support







                Name            Relationship    Address         Phone

 

                    Contact Need        [employer]                [address] [place] [address] [address]

[place]  [address]                      [phone]







Care Team Providers







                    Care Team Member Name    Role                Phone

 

                    Ashley Calvillo MD    PCP                 [phone]







Allergies

No Known Allergies



Medications







                          End Date                  Status



              Medication     Sig          Dispensed     Refills      Start  



                                         Date  

 

                                                    Active



                 AMPHETAMINE-DEXTROAMPHETA     20 mg 3         0                 



                     MINE 20 MG tablet     (three) times       6  



                                         a day. Rarely     



                                         takes TID     

 

                                                    Active



                     dexlansoprazole     Take 60 mg by       0   



                           (DEXILANT) 60 mg capsule     mouth daily.     

 

                                                    Active



              ondansetron (ZOFRAN) 4 MG     Take 1 tablet     20 tablet     0              



                     tablet              (4 mg total)        6  



                                         by mouth     



                                         every 8     



                                         (eight) hours     



                                         as needed for     



                                         nausea or     



                                         vomiting.     







Active Problems





No known active problems



Family History







   



                 Medical History     Relation        Name            Comments

 

   



                           Alcohol abuse             Father  

 

   



                           Drug abuse                Father  

 

   



                           Other                     Father  

 

   



                           No Known Problems         Mother  









   



                 Relation        Name            Status          Comments

 

   



                           Father                    Alive 

 

   



                           Mother                    Alive 







Social History







                                        Date



                 Tobacco Use     Types           Packs/Day       Years Used 

 

                                         



                 Current Every Day Smoker     Cigarettes      0.5             10 









                    Drinks/Week         oz/Week             Comments



                                         Alcohol Use   

 

                                        



3 Cans of beer                                      currently 3 drinks a month - but 6 months prior heavier etoh use





                                         Yes   









 



                           Sex Assigned at Birth     Date Recorded

 

 



                                         Not on file 









                                        Industry



                           Job Start Date            Occupation 

 

                                        Not on file



                           Not on file               Not on file 









                                        Travel End



                           Travel History            Travel Start 

 





                                         No recent travel history available.







Last Filed Vital Signs

Not on file



Plan of Treatment







   



                 Health Maintenance     Due Date        Last Done       Comments

 

   



                           INFLUENZA VACCINE         2019  







Results

Not on fileafter 2018



Insurance







                                        Type



            Payer      Benefit     Subscriber ID     Effective     Phone      Address 



                           Plan /                    Dates   



                                         Group     

 

                                        HMO/PPO



                 [facility]      xxxxxxxxx       2016-P   



                           THCARE                    resent   



                                         CHOICE/CHO     



                                         ICE +     









     



            Guarantor Name     Account     Relation to     Date of     Phone      Billing Address



                     Type                Patient             Birth  

 

     



            Hector Dan     Personal/F     Self       1985     [phone]     [address] [place], [place] [address]y               (Home)              [place] [id number]



                                         [phone] 



                                         (Work)

## 2019-08-27 NOTE — DIAGNOSTIC IMAGING REPORT
Examination: Single AP view of the chest.



COMPARISON: None.



INDICATION: Intubation

     

DISCUSSION:



Lines/tubes:  Endotracheal tube 5 cm above the kina in satisfactory position.



Lungs:  The lungs are well inflated and clear. No pneumonia or pulmonary edema.



Pleura:  No pleural effusion or pneumothorax.



Heart and mediastinum:  The heart and the mediastinum are unremarkable.



Bones and soft tissues:  No acute bony abnormalities.  



IMPRESSION:

 

1.   No acute cardiopulmonary abnormalities.



Signed by: Dr. Andrew Palisch, M.D. on 8/27/2019 10:42 AM

## 2019-08-28 VITALS — SYSTOLIC BLOOD PRESSURE: 144 MMHG | DIASTOLIC BLOOD PRESSURE: 69 MMHG

## 2019-08-28 VITALS — SYSTOLIC BLOOD PRESSURE: 125 MMHG | DIASTOLIC BLOOD PRESSURE: 56 MMHG

## 2019-08-28 VITALS — SYSTOLIC BLOOD PRESSURE: 97 MMHG | DIASTOLIC BLOOD PRESSURE: 60 MMHG

## 2019-08-28 VITALS — DIASTOLIC BLOOD PRESSURE: 87 MMHG | SYSTOLIC BLOOD PRESSURE: 134 MMHG

## 2019-08-28 VITALS — SYSTOLIC BLOOD PRESSURE: 142 MMHG | DIASTOLIC BLOOD PRESSURE: 88 MMHG

## 2019-08-28 VITALS — DIASTOLIC BLOOD PRESSURE: 77 MMHG | SYSTOLIC BLOOD PRESSURE: 118 MMHG

## 2019-08-28 VITALS — SYSTOLIC BLOOD PRESSURE: 115 MMHG | DIASTOLIC BLOOD PRESSURE: 44 MMHG

## 2019-08-28 VITALS — DIASTOLIC BLOOD PRESSURE: 37 MMHG | SYSTOLIC BLOOD PRESSURE: 129 MMHG

## 2019-08-28 VITALS — SYSTOLIC BLOOD PRESSURE: 120 MMHG | DIASTOLIC BLOOD PRESSURE: 74 MMHG

## 2019-08-28 VITALS — SYSTOLIC BLOOD PRESSURE: 115 MMHG | DIASTOLIC BLOOD PRESSURE: 55 MMHG

## 2019-08-28 VITALS — SYSTOLIC BLOOD PRESSURE: 125 MMHG | DIASTOLIC BLOOD PRESSURE: 77 MMHG

## 2019-08-28 VITALS — SYSTOLIC BLOOD PRESSURE: 136 MMHG | DIASTOLIC BLOOD PRESSURE: 67 MMHG

## 2019-08-28 VITALS — DIASTOLIC BLOOD PRESSURE: 80 MMHG | SYSTOLIC BLOOD PRESSURE: 116 MMHG

## 2019-08-28 VITALS — SYSTOLIC BLOOD PRESSURE: 134 MMHG | DIASTOLIC BLOOD PRESSURE: 87 MMHG

## 2019-08-28 LAB
ALBUMIN SERPL-MCNC: 3.2 G/DL (ref 3.5–5)
ALBUMIN SERPL-MCNC: 3.2 G/DL (ref 3.5–5)
ALBUMIN/GLOB SERPL: 1.3 {RATIO} (ref 0.8–2)
ALP SERPL-CCNC: 91 IU/L (ref 40–150)
ALP SERPL-CCNC: 95 IU/L (ref 40–150)
ALT SERPL-CCNC: 107 IU/L (ref 0–55)
ALT SERPL-CCNC: 107 IU/L (ref 0–55)
ANION GAP SERPL CALC-SCNC: 11.8 MMOL/L (ref 8–16)
BASOPHILS # BLD AUTO: 0.1 10*3/UL (ref 0–0.1)
BASOPHILS NFR BLD AUTO: 0.7 % (ref 0–1)
BILIRUB CONJ SERPL-MCNC: 2.1 MG/DL (ref 0–0.5)
BUN SERPL-MCNC: 10 MG/DL (ref 7–26)
BUN/CREAT SERPL: 12 (ref 6–25)
CALCIUM SERPL-MCNC: 8.9 MG/DL (ref 8.4–10.2)
CHLORIDE SERPL-SCNC: 107 MMOL/L (ref 98–107)
CK MB SERPL-MCNC: 3.1 NG/ML (ref 0–5)
CK SERPL-CCNC: 246 IU/L (ref 30–200)
CO2 SERPL-SCNC: 24 MMOL/L (ref 22–29)
DEPRECATED NEUTROPHILS # BLD AUTO: 4.9 10*3/UL (ref 2.1–6.9)
EGFRCR SERPLBLD CKD-EPI 2021: > 60 ML/MIN (ref 60–?)
EOSINOPHIL # BLD AUTO: 0.3 10*3/UL (ref 0–0.4)
EOSINOPHIL NFR BLD AUTO: 3.8 % (ref 0–6)
ERYTHROCYTE [DISTWIDTH] IN CORD BLOOD: 12.7 % (ref 11.7–14.4)
GLOBULIN PLAS-MCNC: 2.5 G/DL (ref 2.3–3.5)
GLUCOSE SERPLBLD-MCNC: 97 MG/DL (ref 74–118)
HCT VFR BLD AUTO: 43.8 % (ref 38.2–49.6)
HGB BLD-MCNC: 14.5 G/DL (ref 14–18)
LYMPHOCYTES # BLD: 1.5 10*3/UL (ref 1–3.2)
LYMPHOCYTES NFR BLD AUTO: 18.9 % (ref 18–39.1)
MAGNESIUM SERPL-MCNC: 1.9 MG/DL (ref 1.3–2.1)
MCH RBC QN AUTO: 31.1 PG (ref 28–32)
MCHC RBC AUTO-ENTMCNC: 33.1 G/DL (ref 31–35)
MCV RBC AUTO: 94 FL (ref 81–99)
MONOCYTES # BLD AUTO: 1 10*3/UL (ref 0.2–0.8)
MONOCYTES NFR BLD AUTO: 12.4 % (ref 4.4–11.3)
NEUTS SEG NFR BLD AUTO: 63.9 % (ref 38.7–80)
PLATELET # BLD AUTO: 279 X10E3/UL (ref 140–360)
POTASSIUM SERPL-SCNC: 3.8 MMOL/L (ref 3.5–5.1)
RBC # BLD AUTO: 4.66 X10E6/UL (ref 4.3–5.7)
SODIUM SERPL-SCNC: 139 MMOL/L (ref 136–145)

## 2019-08-28 RX ADMIN — SODIUM CHLORIDE SCH MLS/HR: 9 INJECTION, SOLUTION INTRAVENOUS at 06:39

## 2019-08-28 NOTE — DIAGNOSTIC IMAGING REPORT
Abdominal ultrasound



Clinical History:  Abnormal LFTs



Discussion:



Sonographic evaluation of the the abdomen is performed.



The liver has normal size and measures 14.1 cm in length. The liver echotexture

is normal, without focal mass. There is no intra or extrahepatic biliary

dilatation. The common bile duct measures 4 mm. The gallbladder has normal

appearance, without wall thickening, stones, or pericholecystic fluid.  The

main portal vein diameter is normal,  measuring 13 mm.



The pancreatic head, body, and proximal tail demonstrate no abnormality. There

is no ascites.



The right and the left kidney measure 10.2 and 12.0 cm in length respectively

and are normal in size. There is no renal mass, hydronephrosis, or shadowing

renal calculus.



The spleen measures 12.1 cm in length and is normal in echotexture.



Segments of the inferior vena cava and aorta visualized demonstrate no

abnormality.



Impression:



1. Mild splenomegaly. Otherwise, unremarkable abdominal ultrasound.



Signed by: Dr. Walter Anaya MD on 8/28/2019 11:25 AM

## 2019-08-28 NOTE — DISCHARGE SUMMARY
ADMISSION DIAGNOSES:  

1. AMS, chemically induced.

2. Rhabdomyolysis.

3. Chronic pain.

4. History of drug use.

5. Opioid withdrawal.

 

DISCHARGE DIAGNOSES:  

1. AMS, chemically induced.

2. Rhabdomyolysis.

3. Chronic pain.

4. History of drug use.

5. Opioid withdrawal.

6. Rule out cerebrovascular accident.

7. Rule out transient ischemic attack.

 

HISTORY:  ADHD, chronic pain.

 

SURGICAL HISTORY:  Bilateral total knee replacement.

 

FAMILY HISTORY:  The patient's grandfather had cancer and diabetes.

 

SOCIAL HISTORY:  Occasional alcohol use, one pack cigarettes a day.

 

HOSPITAL COURSE:  A 34-year-old male, brought to the ER by his wife due to cold sweats,

twitching, restless, and acting weird.  He was tachypneic at 30 in the ER, but his SpO2

was 99% or more.  ER MD and wife assumed he was withdrawing from morphine.  Urine drug

screen was positive for amphetamines, methamphetamines, and benzos.  The patient is

known to be taking morphine and Percocet, but it is not on the urine drug screen

suggesting opioid withdrawal.  Chest x-ray was negative.  CT of the brain was negative.

Ultrasound of the abdomen showed mild slim splenomegaly.  LFTs were elevated, but not

concerning.  The patient will be discharged home and follow up with primary care in 1 to

2 weeks and Pain Management today.  The patient and wife understand discharge

instructions and agrees to plan.  Vital signs stable and the patient is afebrile. 

 

 

Dictated by Julia Hurst NP

 

______________________________

Wililam Astudillo MD

 

EVANGELINA/MODL

D:  08/28/2019 17:02:21

T:  08/28/2019 19:35:49

Job #:  [id number]

## 2019-08-28 NOTE — NUR
Nutrition Screen Note



RD Recommendation for Physician: 

-Continue diet as ordered 



Plan of Care: RD following, monitoring for tolerance and adequacy



Nutrition reason for involvement:

Nutrition Risk Trigger  MST 



Primary Diagnose(s): substance abuse, delirium, agitated



PMH: 

1. Chronic knee pain.

2. ADHD.

3. Hypertension.



Ht: 73in 

Wt: 210lb

BMI: 27.7kg/m2

IBW: 184lb +/- 10%



RD Assessment:

(8/28) Chart reviewed. Labs and meds reviewed. 33yo M, who was admitted for agitation. Pt 
was extubated yesterday. Currently on regular diet. Tolerating diet well without any nausea 
or vomiting. Pt reported eating like usual PTA. No recent weight loss noted. Pt denied any 
chewing or swallowing difficulty. Current diet is appropriate and adequate. 



Current Diet: regular diet 



Malnutrition Evaluation (8/28/2019)

The patient does not meet criteria for a specified degree of malnutrition at this time. Will 
re-evaluate at follow-up as appropriate. 



Diet Education Needs Assessment:

Diet education not indicated.



Nutrition Care Level: low 



Signed: Gali Fan, MS, RD, LD

## 2019-08-28 NOTE — PROGRESS NOTE
DATE:    

 

SUBJECTIVE:  The patient feels much better today.  He is agitated.  He is not

complaining of anxiety. 

 

His blood tests did show an elevated bilirubin and transaminases.

 

PHYSICAL EXAMINATION:

VITAL SIGNS:  The patient is afebrile.  The blood pressure is 136/67 and the pulse is

99.  Saturation is 99% on 3 L. 

HEENT:  Shows no facial swelling or erythema.  The oropharynx is normal. 

LYMPHATIC:  Shows no submandibular, cervical, or supraclavicular adenopathy. 

CARDIAC:  Reveals regular rate and rhythm with normal S1, S2.  There are no murmurs or

rubs. 

LUNGS:  Auscultation of lungs reveals clear breath sounds bilaterally.  There is no

wheezing. 

ABDOMEN:  Soft, nontender.  There is no rebound or guarding. 

EXTREMITIES:  Show no leg edema or calf tenderness.  There is no cyanosis or clubbing. 

SKIN:  Shows no rashes. 

NEUROLOGICAL:  Shows no focal abnormalities.

IMPRESSION:  

1. Elevated liver function tests of unclear cause.

2. Acute delirium.

3. Toxic metabolic encephalopathy.

 

PLAN:  

1. Abdominal ultrasound.

2. Hepatitis serologies.

3. Repeat liver function tests tomorrow.

4. Pain Management consultation.

 

 

 

 

______________________________

Gen Pineda MD [facility]ANCISCO JAVIER

D:  08/28/2019 08:11:27

T:  08/28/2019 10:03:45

Job #:  [id number]

## 2019-08-28 NOTE — NUR
PATIENT AMBULATED TO TOILET WITHOUT ANY DIFFICULTY. NO SIGNS OF ANY SOB, DENIES DIZZINESS

-------------------------------------------------------------------------------

Addendum: 08/28/19 at 0837 by Heather Rock RN

-------------------------------------------------------------------------------

Amended: Links added.

## 2019-08-28 NOTE — NUR
duragesic patch from left shoulder removed and placed in sharps container. gave 1 time dose 
of norco. assess fro discharge in hr